# Patient Record
Sex: MALE | Race: WHITE | NOT HISPANIC OR LATINO | Employment: FULL TIME | URBAN - METROPOLITAN AREA
[De-identification: names, ages, dates, MRNs, and addresses within clinical notes are randomized per-mention and may not be internally consistent; named-entity substitution may affect disease eponyms.]

---

## 2022-05-23 ENCOUNTER — OCCUPATIONAL MEDICINE (OUTPATIENT)
Dept: URGENT CARE | Facility: CLINIC | Age: 47
End: 2022-05-23
Payer: COMMERCIAL

## 2022-05-23 VITALS
WEIGHT: 297 LBS | SYSTOLIC BLOOD PRESSURE: 108 MMHG | DIASTOLIC BLOOD PRESSURE: 72 MMHG | HEIGHT: 73 IN | RESPIRATION RATE: 16 BRPM | TEMPERATURE: 97.3 F | OXYGEN SATURATION: 97 % | HEART RATE: 82 BPM | BODY MASS INDEX: 39.36 KG/M2

## 2022-05-23 DIAGNOSIS — S09.90XA CLOSED HEAD INJURY, INITIAL ENCOUNTER: Primary | ICD-10-CM

## 2022-05-23 DIAGNOSIS — M62.838 SPASM OF CERVICAL PARASPINOUS MUSCLE: ICD-10-CM

## 2022-05-23 DIAGNOSIS — S06.0X0A CONCUSSION WITHOUT LOSS OF CONSCIOUSNESS, INITIAL ENCOUNTER: ICD-10-CM

## 2022-05-23 DIAGNOSIS — R11.0 NAUSEA: ICD-10-CM

## 2022-05-23 DIAGNOSIS — S46.912A MUSCLE STRAIN OF LEFT SHOULDER REGION, INITIAL ENCOUNTER: ICD-10-CM

## 2022-05-23 DIAGNOSIS — S00.93XA CONTUSION OF HEAD AND NECK REGION: ICD-10-CM

## 2022-05-23 DIAGNOSIS — S10.93XA CONTUSION OF HEAD AND NECK REGION: ICD-10-CM

## 2022-05-23 DIAGNOSIS — S80.01XA CONTUSION OF RIGHT KNEE, INITIAL ENCOUNTER: ICD-10-CM

## 2022-05-23 PROCEDURE — 99204 OFFICE O/P NEW MOD 45 MIN: CPT | Performed by: PHYSICIAN ASSISTANT

## 2022-05-23 RX ORDER — CYCLOBENZAPRINE HCL 10 MG
10 TABLET ORAL 3 TIMES DAILY PRN
Qty: 30 TABLET | Refills: 0 | Status: SHIPPED | OUTPATIENT
Start: 2022-05-23 | End: 2024-01-25

## 2022-05-23 RX ORDER — ONDANSETRON 4 MG/1
4 TABLET, ORALLY DISINTEGRATING ORAL EVERY 6 HOURS PRN
Qty: 10 TABLET | Refills: 0 | Status: SHIPPED | OUTPATIENT
Start: 2022-05-23 | End: 2024-01-25
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
## 2022-05-25 ENCOUNTER — TELEPHONE (OUTPATIENT)
Dept: URGENT CARE | Facility: CLINIC | Age: 47
End: 2022-05-25
Payer: COMMERCIAL

## 2022-05-25 NOTE — TELEPHONE ENCOUNTER
Called pt regarding issue/question regarding medications.  Message left for pt to return call, no information left on message as the outgoing message did not identify Loco specifically.

## 2022-05-26 ENCOUNTER — OCCUPATIONAL MEDICINE (OUTPATIENT)
Dept: URGENT CARE | Facility: CLINIC | Age: 47
End: 2022-05-26
Payer: COMMERCIAL

## 2022-05-26 VITALS
HEIGHT: 73 IN | WEIGHT: 296 LBS | TEMPERATURE: 97 F | SYSTOLIC BLOOD PRESSURE: 110 MMHG | RESPIRATION RATE: 16 BRPM | HEART RATE: 60 BPM | OXYGEN SATURATION: 96 % | DIASTOLIC BLOOD PRESSURE: 68 MMHG | BODY MASS INDEX: 39.23 KG/M2

## 2022-05-26 DIAGNOSIS — S00.93XA CONTUSION OF HEAD AND NECK REGION: ICD-10-CM

## 2022-05-26 DIAGNOSIS — S09.90XD CLOSED HEAD INJURY, SUBSEQUENT ENCOUNTER: ICD-10-CM

## 2022-05-26 DIAGNOSIS — S10.93XA CONTUSION OF HEAD AND NECK REGION: ICD-10-CM

## 2022-05-26 DIAGNOSIS — S06.0X0D CONCUSSION WITHOUT LOSS OF CONSCIOUSNESS, SUBSEQUENT ENCOUNTER: ICD-10-CM

## 2022-05-26 PROCEDURE — 99214 OFFICE O/P EST MOD 30 MIN: CPT | Performed by: PHYSICIAN ASSISTANT

## 2022-05-26 RX ORDER — KETOROLAC TROMETHAMINE 30 MG/ML
30 INJECTION, SOLUTION INTRAMUSCULAR; INTRAVENOUS ONCE
Status: COMPLETED | OUTPATIENT
Start: 2022-05-26 | End: 2022-05-26

## 2022-05-26 RX ORDER — ONDANSETRON 4 MG/1
4 TABLET, FILM COATED ORAL EVERY 6 HOURS PRN
Qty: 20 TABLET | Refills: 1 | Status: SHIPPED | OUTPATIENT
Start: 2022-05-26 | End: 2022-05-26 | Stop reason: CLARIF

## 2022-05-26 RX ORDER — ONDANSETRON 4 MG/1
4 TABLET, ORALLY DISINTEGRATING ORAL ONCE
Status: COMPLETED | OUTPATIENT
Start: 2022-05-26 | End: 2022-05-26

## 2022-05-26 RX ADMIN — KETOROLAC TROMETHAMINE 30 MG: 30 INJECTION, SOLUTION INTRAMUSCULAR; INTRAVENOUS at 10:15

## 2022-05-26 RX ADMIN — ONDANSETRON 4 MG: 4 TABLET, ORALLY DISINTEGRATING ORAL at 10:18
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
## 2022-05-29 PROBLEM — E66.01 MORBID OBESITY (HCC): Status: ACTIVE | Noted: 2022-05-29

## 2022-05-29 PROBLEM — G47.33 OBSTRUCTIVE SLEEP APNEA SYNDROME: Status: ACTIVE | Noted: 2022-05-29

## 2022-05-29 ASSESSMENT — ENCOUNTER SYMPTOMS
HEADACHES: 1
DIZZINESS: 1
FOCAL WEAKNESS: 0
LOSS OF CONSCIOUSNESS: 0
SENSORY CHANGE: 0
NECK PAIN: 1

## 2022-05-31 ENCOUNTER — OCCUPATIONAL MEDICINE (OUTPATIENT)
Dept: URGENT CARE | Facility: CLINIC | Age: 47
End: 2022-05-31
Payer: COMMERCIAL

## 2022-05-31 VITALS
SYSTOLIC BLOOD PRESSURE: 108 MMHG | TEMPERATURE: 97.8 F | HEART RATE: 95 BPM | RESPIRATION RATE: 18 BRPM | WEIGHT: 296 LBS | DIASTOLIC BLOOD PRESSURE: 78 MMHG | OXYGEN SATURATION: 97 % | BODY MASS INDEX: 39.23 KG/M2 | HEIGHT: 73 IN

## 2022-05-31 DIAGNOSIS — M62.838 SPASM OF CERVICAL PARASPINOUS MUSCLE: ICD-10-CM

## 2022-05-31 DIAGNOSIS — S09.90XD CLOSED HEAD INJURY, SUBSEQUENT ENCOUNTER: ICD-10-CM

## 2022-05-31 PROCEDURE — 99213 OFFICE O/P EST LOW 20 MIN: CPT | Performed by: PHYSICIAN ASSISTANT

## 2022-05-31 ASSESSMENT — ENCOUNTER SYMPTOMS
NECK PAIN: 0
NAUSEA: 0
MYALGIAS: 0
VOMITING: 0
PHOTOPHOBIA: 0
BLURRED VISION: 0
WEAKNESS: 0
DIZZINESS: 0
DOUBLE VISION: 0
TINGLING: 0
HEADACHES: 0
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
## 2024-01-25 ENCOUNTER — OFFICE VISIT (OUTPATIENT)
Dept: MEDICAL GROUP | Facility: PHYSICIAN GROUP | Age: 49
End: 2024-01-25
Payer: COMMERCIAL

## 2024-01-25 VITALS
OXYGEN SATURATION: 96 % | DIASTOLIC BLOOD PRESSURE: 60 MMHG | BODY MASS INDEX: 40.29 KG/M2 | WEIGHT: 304.01 LBS | TEMPERATURE: 98.2 F | HEART RATE: 72 BPM | SYSTOLIC BLOOD PRESSURE: 102 MMHG | RESPIRATION RATE: 16 BRPM | HEIGHT: 73 IN

## 2024-01-25 DIAGNOSIS — Z12.12 SCREENING FOR COLORECTAL CANCER: ICD-10-CM

## 2024-01-25 DIAGNOSIS — R79.89 LOW TESTOSTERONE IN MALE: ICD-10-CM

## 2024-01-25 DIAGNOSIS — G47.33 OBSTRUCTIVE SLEEP APNEA SYNDROME: ICD-10-CM

## 2024-01-25 DIAGNOSIS — Z12.11 SCREENING FOR COLORECTAL CANCER: ICD-10-CM

## 2024-01-25 DIAGNOSIS — Z23 NEED FOR VACCINATION: ICD-10-CM

## 2024-01-25 DIAGNOSIS — E66.01 CLASS 3 SEVERE OBESITY DUE TO EXCESS CALORIES WITH SERIOUS COMORBIDITY AND BODY MASS INDEX (BMI) OF 40.0 TO 44.9 IN ADULT (HCC): ICD-10-CM

## 2024-01-25 DIAGNOSIS — E66.01 MORBID OBESITY WITH BMI OF 40.0-44.9, ADULT (HCC): ICD-10-CM

## 2024-01-25 DIAGNOSIS — Z11.3 SCREEN FOR STD (SEXUALLY TRANSMITTED DISEASE): ICD-10-CM

## 2024-01-25 PROBLEM — E66.09 OBESITY DUE TO EXCESS CALORIES WITH SERIOUS COMORBIDITY: Status: ACTIVE | Noted: 2024-01-25

## 2024-01-25 PROCEDURE — 3078F DIAST BP <80 MM HG: CPT | Performed by: STUDENT IN AN ORGANIZED HEALTH CARE EDUCATION/TRAINING PROGRAM

## 2024-01-25 PROCEDURE — 90471 IMMUNIZATION ADMIN: CPT | Performed by: STUDENT IN AN ORGANIZED HEALTH CARE EDUCATION/TRAINING PROGRAM

## 2024-01-25 PROCEDURE — 99396 PREV VISIT EST AGE 40-64: CPT | Mod: 25 | Performed by: STUDENT IN AN ORGANIZED HEALTH CARE EDUCATION/TRAINING PROGRAM

## 2024-01-25 PROCEDURE — 90746 HEPB VACCINE 3 DOSE ADULT IM: CPT | Performed by: STUDENT IN AN ORGANIZED HEALTH CARE EDUCATION/TRAINING PROGRAM

## 2024-01-25 PROCEDURE — 90472 IMMUNIZATION ADMIN EACH ADD: CPT | Performed by: STUDENT IN AN ORGANIZED HEALTH CARE EDUCATION/TRAINING PROGRAM

## 2024-01-25 PROCEDURE — 90686 IIV4 VACC NO PRSV 0.5 ML IM: CPT | Performed by: STUDENT IN AN ORGANIZED HEALTH CARE EDUCATION/TRAINING PROGRAM

## 2024-01-25 PROCEDURE — 3074F SYST BP LT 130 MM HG: CPT | Performed by: STUDENT IN AN ORGANIZED HEALTH CARE EDUCATION/TRAINING PROGRAM

## 2024-01-25 ASSESSMENT — ENCOUNTER SYMPTOMS
SHORTNESS OF BREATH: 0
CHILLS: 0
DIARRHEA: 0
ORTHOPNEA: 0
FOCAL WEAKNESS: 0
FEVER: 0
CONSTIPATION: 0
HEARTBURN: 0
NAUSEA: 0
DIZZINESS: 0
COUGH: 0
ABDOMINAL PAIN: 0
VOMITING: 0
PALPITATIONS: 0
WHEEZING: 0
HEADACHES: 0

## 2024-01-25 ASSESSMENT — PATIENT HEALTH QUESTIONNAIRE - PHQ9: CLINICAL INTERPRETATION OF PHQ2 SCORE: 0

## 2024-01-25 NOTE — LETTER
[employer]  Andreia Alvarado D.O.  [address]  [facility] [address]  Fax: [phone]   Authorization for Release/Disclosure of   Protected Health Information   Name: LOCO MONZON : 1975 SSN: [id number]   Address: [facility] [address]  [facility] [address] Phone:    [phone] (home)    I authorize the entity listed below to release/disclose the PHI below to:   [employer]/Andreia Alvarado D.O. and Andreia Alvarado D.O.   Provider or Entity Name:  [facility]    Address   City, State, [facility]   Phone:      Fax:     Reason for request: continuity of care   Information to be released:    [  ] LAST COLONOSCOPY,  including any PATH REPORT and follow-up  [  ] LAST FIT/COLOGUARD RESULT [  ] LAST DEXA  [  ] LAST MAMMOGRAM  [  ] LAST PAP  [  ] LAST LABS [  ] RETINA EXAM REPORT  [  ] IMMUNIZATION RECORDS  [ x ] Release all info      [  ] Check here and initial the line next to each item to release ALL health information INCLUDING  _____ Care and treatment for drug and / or alcohol abuse  _____ HIV testing, infection status, or AIDS  _____ Genetic Testing    DATES OF SERVICE OR TIME PERIOD TO BE DISCLOSED: _____________  I understand and acknowledge that:  * This Authorization may be revoked at any time by you in writing, except if your health information has already been used or disclosed.  * Your health information that will be used or disclosed as a result of you signing this authorization could be re-disclosed by the recipient. If this occurs, your re-disclosed health information may no longer be protected by State or Federal laws.  * You may refuse to sign this Authorization. Your refusal will not affect your ability to obtain treatment.  * This Authorization becomes effective upon signing and will  on (date) __________.      If no date is indicated, this Authorization will  one (1) year from the signature date.    Name: Loco Monzon  Signature: Date:   2024     PLEASE FAX  REQUESTED RECORDS BACK TO: [phone]

## 2024-01-26 NOTE — PROGRESS NOTES
Subjective:   HISTORY OF THE PRESENT ILLNESS: Patient is a 48 y.o. male here today to establish care. His/her prior PCP was Yoni .    Problem   Obesity Due to Excess Calories With Serious Comorbidity   Morbid Obesity With Bmi of 40.0-44.9, Adult ([facility])   Obstructive Sleep Apnea Syndrome    Last sleep study 5 years ago  Reportedly severe sleep apnea  Last time he has had a mask was 5 months ago. It had broken and did not have insurance.             Review of systems:  Review of Systems   Constitutional:  Negative for chills and fever.   Respiratory:  Negative for cough, shortness of breath and wheezing.    Cardiovascular:  Negative for chest pain, palpitations, orthopnea and leg swelling.   Gastrointestinal:  Negative for abdominal pain, constipation, diarrhea, heartburn, melena, nausea and vomiting.   Genitourinary:  Negative for dysuria.   Musculoskeletal:  Negative for joint pain.   Neurological:  Negative for dizziness, focal weakness and headaches.         Patient Active Problem List    Diagnosis Date Noted    Obesity due to excess calories with serious comorbidity 01/25/2024    Morbid obesity with BMI of 40.0-44.9, adult ([facility]) 01/25/2024    Morbid obesity ([facility]) 05/29/2022    Obstructive sleep apnea syndrome 05/29/2022     Past Surgical History:   Procedure Laterality Date    CHOLECYSTECTOMY       Social History     Tobacco Use    Smoking status: Never    Smokeless tobacco: Former     Types: Chew   Vaping Use    Vaping Use: Every day    Substances: Nicotine    Devices: [employer] tank   Substance Use Topics    Alcohol use: No    Drug use: No      Family History   Problem Relation Age of Onset    Cancer Mother         breast    No Known Problems Father      Current Outpatient Medications   Medication Sig Dispense Refill    ibuprofen (MOTRIN) 800 MG Tab Take 1 tablet by mouth every 8 hours as needed. 90 tablet 0     No current facility-administered medications for this visit.       Allergies:  Allergies   Allergen  "Reactions    Bee Hives     Meant to take epi pen    Penicillins Rash     Rash all over    Penicillin G Unspecified       Allergies, past medical history, past surgical history, family history, social history reviewed and updated.    Objective:    /60 (BP Location: Right arm, Patient Position: Sitting, BP Cuff Size: Adult)   Pulse 72   Temp 36.8 °C (98.2 °F) (Temporal)   Resp 16   Ht 1.854 m (6' 1\")   Wt (!) 138 kg (304 lb 0.2 oz)   SpO2 96%   BMI 40.11 kg/m²    Body mass index is 40.11 kg/m².    Physical exam:  General: Normal appearance, no acute distress, not ill-appearing  HEENT: EOM intact, conjunctiva normal limits, negative right/left eye discharge.  Sclera anicteric, tm wnl bilaterally   Cardiovascular: Normal rate and rhythm, no murmurs  Pulmonary: No respiratory distress, no wheezing, no rales, breath sounds normal.  Musculoskeletal: No edema bilaterally  Skin: Warm, dry, no lesions  Neurological: No focal deficits, normal gait  Psychiatric: Mood within normal limits    Assessment/Plan:    Patient here for a preventive medicine visit today and to establish care.  -Reviewed all past medical history, family history, social history    -Diet and exercise appropriate counseling given  -Social determinants of health reviewed  -Tobacco, alcohol, recreational drug use: advised on nicotine vaping   -Cholesterol screening: ordered  -Diabetes screening: ordered      Immunizations/Infectious disease:  STI screening:ordered  Safe sex practices discusssed  HIV screening: ordered  Immunizations: hep b, influenza today    Cancer screenings:  Colorectal cancer screening: colo guard ordered   Prostate Cancer Screening/PSA: not indicated         Problem List Items Addressed This Visit       Obstructive sleep apnea syndrome     Referral to sleep medicine          Relevant Orders    Referral to Pulmonary and Sleep Medicine    CBC WITHOUT DIFFERENTIAL    Obesity due to excess calories with serious comorbidity    " Morbid obesity with BMI of 40.0-44.9, adult (HCC)    Relevant Orders    HEMOGLOBIN A1C    Lipid Profile    Comp Metabolic Panel    TSH WITH REFLEX TO FT4     Other Visit Diagnoses       Need for vaccination        Relevant Orders    INFLUENZA VACCINE QUAD INJ (PF) (Completed)    Hepatitis B Vaccine Adult IM (Completed)    Low testosterone in male        Relevant Orders    TESTOSTERONE SERUM    Screening for colorectal cancer        Relevant Orders    COLOGUARD (FIT DNA)    Screen for STD (sexually transmitted disease)        Relevant Orders    Chlamydia/GC, PCR (Urine)    HIV AG/AB Combo Assay Screening    T.Pallidum AB DRISS (Screening)    Hepatitis C Virus Antibody    Hep B Surface Antigen             Patient Counseling:  --Discussed moderation in caloric intake, sufficient fresh fruits/vegetables, fiber, iron  --Discussed brushing, flossing, and dental visits.   --Encouraged regular exercise.   --Discussed tobacco, alcohol, or other drug use.  --Discussed sexually transmitted infections, partner selection  --Injury prevention: Discussed     Preventive visit in 1 year, sooner as needed for any concerns.     Return in about 1 year (around 1/25/2025), or if symptoms worsen or fail to improve, for labs.

## 2024-02-08 ENCOUNTER — HOSPITAL ENCOUNTER (OUTPATIENT)
Dept: LAB | Facility: MEDICAL CENTER | Age: 49
End: 2024-02-08
Attending: STUDENT IN AN ORGANIZED HEALTH CARE EDUCATION/TRAINING PROGRAM
Payer: COMMERCIAL

## 2024-02-08 DIAGNOSIS — Z11.3 SCREEN FOR STD (SEXUALLY TRANSMITTED DISEASE): ICD-10-CM

## 2024-02-08 DIAGNOSIS — R79.89 LOW TESTOSTERONE IN MALE: ICD-10-CM

## 2024-02-08 DIAGNOSIS — G47.33 OBSTRUCTIVE SLEEP APNEA SYNDROME: ICD-10-CM

## 2024-02-08 DIAGNOSIS — E66.01 MORBID OBESITY WITH BMI OF 40.0-44.9, ADULT (HCC): ICD-10-CM

## 2024-02-08 LAB
ALBUMIN SERPL BCP-MCNC: 4.2 G/DL (ref 3.2–4.9)
ALBUMIN/GLOB SERPL: 1.2 G/DL
ALP SERPL-CCNC: 72 U/L (ref 30–99)
ALT SERPL-CCNC: 16 U/L (ref 2–50)
ANION GAP SERPL CALC-SCNC: 11 MMOL/L (ref 7–16)
AST SERPL-CCNC: 9 U/L (ref 12–45)
BILIRUB SERPL-MCNC: 0.3 MG/DL (ref 0.1–1.5)
BUN SERPL-MCNC: 21 MG/DL (ref 8–22)
CALCIUM ALBUM COR SERPL-MCNC: 8.8 MG/DL (ref 8.5–10.5)
CALCIUM SERPL-MCNC: 9 MG/DL (ref 8.5–10.5)
CHLORIDE SERPL-SCNC: 104 MMOL/L (ref 96–112)
CHOLEST SERPL-MCNC: 131 MG/DL (ref 100–199)
CO2 SERPL-SCNC: 24 MMOL/L (ref 20–33)
CREAT SERPL-MCNC: 0.99 MG/DL (ref 0.5–1.4)
ERYTHROCYTE [DISTWIDTH] IN BLOOD BY AUTOMATED COUNT: 41.3 FL (ref 35.9–50)
EST. AVERAGE GLUCOSE BLD GHB EST-MCNC: 97 MG/DL
FASTING STATUS PATIENT QL REPORTED: NORMAL
GFR SERPLBLD CREATININE-BSD FMLA CKD-EPI: 95 ML/MIN/1.73 M 2
GLOBULIN SER CALC-MCNC: 3.6 G/DL (ref 1.9–3.5)
GLUCOSE SERPL-MCNC: 88 MG/DL (ref 65–99)
HBA1C MFR BLD: 5 % (ref 4–5.6)
HBV SURFACE AG SER QL: NORMAL
HCT VFR BLD AUTO: 45.4 % (ref 42–52)
HCV AB SER QL: NORMAL
HDLC SERPL-MCNC: 30 MG/DL
HGB BLD-MCNC: 15.8 G/DL (ref 14–18)
HIV 1+2 AB+HIV1 P24 AG SERPL QL IA: NORMAL
LDLC SERPL CALC-MCNC: 88 MG/DL
MCH RBC QN AUTO: 30.9 PG (ref 27–33)
MCHC RBC AUTO-ENTMCNC: 34.8 G/DL (ref 32.3–36.5)
MCV RBC AUTO: 88.7 FL (ref 81.4–97.8)
PLATELET # BLD AUTO: 291 K/UL (ref 164–446)
PMV BLD AUTO: 10.4 FL (ref 9–12.9)
POTASSIUM SERPL-SCNC: 4.3 MMOL/L (ref 3.6–5.5)
PROT SERPL-MCNC: 7.8 G/DL (ref 6–8.2)
RBC # BLD AUTO: 5.12 M/UL (ref 4.7–6.1)
SODIUM SERPL-SCNC: 139 MMOL/L (ref 135–145)
T PALLIDUM AB SER QL IA: NORMAL
TESTOST SERPL-MCNC: 336 NG/DL (ref 175–781)
TRIGL SERPL-MCNC: 66 MG/DL (ref 0–149)
TSH SERPL DL<=0.005 MIU/L-ACNC: 1.62 UIU/ML (ref 0.38–5.33)
WBC # BLD AUTO: 9.4 K/UL (ref 4.8–10.8)

## 2024-02-08 PROCEDURE — 87389 HIV-1 AG W/HIV-1&-2 AB AG IA: CPT

## 2024-02-08 PROCEDURE — 85027 COMPLETE CBC AUTOMATED: CPT

## 2024-02-08 PROCEDURE — 84403 ASSAY OF TOTAL TESTOSTERONE: CPT

## 2024-02-08 PROCEDURE — 80061 LIPID PANEL: CPT

## 2024-02-08 PROCEDURE — 36415 COLL VENOUS BLD VENIPUNCTURE: CPT

## 2024-02-08 PROCEDURE — 86780 TREPONEMA PALLIDUM: CPT

## 2024-02-08 PROCEDURE — 87591 N.GONORRHOEAE DNA AMP PROB: CPT

## 2024-02-08 PROCEDURE — 83036 HEMOGLOBIN GLYCOSYLATED A1C: CPT

## 2024-02-08 PROCEDURE — 86803 HEPATITIS C AB TEST: CPT

## 2024-02-08 PROCEDURE — 87340 HEPATITIS B SURFACE AG IA: CPT

## 2024-02-08 PROCEDURE — 87491 CHLMYD TRACH DNA AMP PROBE: CPT

## 2024-02-08 PROCEDURE — 80053 COMPREHEN METABOLIC PANEL: CPT

## 2024-02-08 PROCEDURE — 84443 ASSAY THYROID STIM HORMONE: CPT

## 2024-02-10 LAB
C TRACH DNA SPEC QL NAA+PROBE: NEGATIVE
N GONORRHOEA DNA SPEC QL NAA+PROBE: NEGATIVE
SPECIMEN SOURCE: NORMAL

## 2024-07-03 ENCOUNTER — APPOINTMENT (OUTPATIENT)
Dept: SLEEP MEDICINE | Facility: MEDICAL CENTER | Age: 49
End: 2024-07-03
Attending: PREVENTIVE MEDICINE
Payer: COMMERCIAL